# Patient Record
Sex: FEMALE | Race: WHITE
[De-identification: names, ages, dates, MRNs, and addresses within clinical notes are randomized per-mention and may not be internally consistent; named-entity substitution may affect disease eponyms.]

---

## 2023-07-26 ENCOUNTER — HOSPITAL ENCOUNTER (OUTPATIENT)
Dept: HOSPITAL 47 - RADMRIMAIN | Age: 23
Discharge: HOME | End: 2023-07-26
Attending: PODIATRIST
Payer: COMMERCIAL

## 2023-07-26 DIAGNOSIS — S92.324K: Primary | ICD-10-CM
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
## 2023-08-02 NOTE — MR
EXAMINATION TYPE: MR foot RT wo con

 

DATE OF EXAM: 7/26/2023

 

COMPARISON: No radiographic correlation available

 

HISTORY: 22-year-old female Right foot pain, possible stress fracture to second metatarsal.  S92.324K
 NONDISP FX OF 2ND METATARSAL BONE, R FT, 

 

 

TECHNIQUE: Multiplanar, multisequence images of the right foot were obtained without IV contrast. 

 

FINDINGS: 

Small tibiotalar joint effusion. Tibiotalar and subtalar joints are intact..

 

Lisfranc ligament is visualized intact.

 

Preserved fatty signal in the sinus Tarsi. The tarsal tunnel is intact.

 

Medial and lateral ligaments of the ankle appear grossly intact as is the syndesmosis.

 

No acute or healing fracture is identified.

 

Small effusions within the first, second, and third web spaces of the intermetatarsal bursa.

 

No significant soft tissue abnormality seen.

 

Smooth delineation to the Achilles tendon. Origin of the plantar fascia is intact.

 

 

IMPRESSION: 

No acute or healing fracture is identified. No stress fracture or other significant abnormality is id
entified.